# Patient Record
Sex: MALE | Race: BLACK OR AFRICAN AMERICAN | URBAN - METROPOLITAN AREA
[De-identification: names, ages, dates, MRNs, and addresses within clinical notes are randomized per-mention and may not be internally consistent; named-entity substitution may affect disease eponyms.]

---

## 2020-09-15 ENCOUNTER — OFFICE VISIT (OUTPATIENT)
Dept: URBAN - METROPOLITAN AREA CLINIC 17 | Facility: CLINIC | Age: 50
End: 2020-09-15
Payer: COMMERCIAL

## 2020-09-15 ENCOUNTER — LAB OUTSIDE AN ENCOUNTER (OUTPATIENT)
Dept: URBAN - METROPOLITAN AREA CLINIC 17 | Facility: CLINIC | Age: 50
End: 2020-09-15

## 2020-09-15 ENCOUNTER — DASHBOARD ENCOUNTERS (OUTPATIENT)
Age: 50
End: 2020-09-15

## 2020-09-15 DIAGNOSIS — R74.8 ELEVATED LIPASE: ICD-10-CM

## 2020-09-15 DIAGNOSIS — R10.12 LEFT UPPER QUADRANT PAIN: ICD-10-CM

## 2020-09-15 DIAGNOSIS — E66.9 OBESITY (BMI 30.0-34.9): ICD-10-CM

## 2020-09-15 DIAGNOSIS — I10 ESSENTIAL HYPERTENSION: ICD-10-CM

## 2020-09-15 DIAGNOSIS — F10.10 ALCOHOL ABUSE: ICD-10-CM

## 2020-09-15 DIAGNOSIS — R10.11 RIGHT UPPER QUADRANT PAIN: ICD-10-CM

## 2020-09-15 DIAGNOSIS — Z12.11 SCREEN FOR COLON CANCER: ICD-10-CM

## 2020-09-15 DIAGNOSIS — Z80.0 FAMILY HISTORY OF COLON CANCER: ICD-10-CM

## 2020-09-15 DIAGNOSIS — K21.9 GASTROESOPHAGEAL REFLUX DISEASE, ESOPHAGITIS PRESENCE NOT SPECIFIED: ICD-10-CM

## 2020-09-15 PROBLEM — 312824007: Status: ACTIVE | Noted: 2020-09-15

## 2020-09-15 PROBLEM — 43364001: Status: ACTIVE | Noted: 2020-09-15

## 2020-09-15 PROBLEM — 59621000: Status: ACTIVE | Noted: 2020-09-15

## 2020-09-15 PROBLEM — 305058001: Status: ACTIVE | Noted: 2020-09-15

## 2020-09-15 PROBLEM — 235595009: Status: ACTIVE | Noted: 2020-09-15

## 2020-09-15 PROBLEM — 15167005: Status: ACTIVE | Noted: 2020-09-15

## 2020-09-15 PROBLEM — 443371000124107: Status: ACTIVE | Noted: 2020-09-15

## 2020-09-15 PROBLEM — 441957004: Status: ACTIVE | Noted: 2020-09-15

## 2020-09-15 PROBLEM — 301715003: Status: ACTIVE | Noted: 2020-09-15

## 2020-09-15 PROCEDURE — 1036F TOBACCO NON-USER: CPT | Performed by: INTERNAL MEDICINE

## 2020-09-15 PROCEDURE — G8417 CALC BMI ABV UP PARAM F/U: HCPCS | Performed by: INTERNAL MEDICINE

## 2020-09-15 PROCEDURE — G8427 DOCREV CUR MEDS BY ELIG CLIN: HCPCS | Performed by: INTERNAL MEDICINE

## 2020-09-15 PROCEDURE — G8753 SYS BP > OR = 140: HCPCS | Performed by: INTERNAL MEDICINE

## 2020-09-15 PROCEDURE — 99205 OFFICE O/P NEW HI 60 MIN: CPT | Performed by: INTERNAL MEDICINE

## 2020-09-15 PROCEDURE — 3017F COLORECTAL CA SCREEN DOC REV: CPT | Performed by: INTERNAL MEDICINE

## 2020-09-15 RX ORDER — OMEPRAZOLE 10 MG/1
1 CAPSULE 30 MINUTES BEFORE MORNING MEAL CAPSULE, DELAYED RELEASE ORAL ONCE A DAY
Status: DISCONTINUED | COMMUNITY

## 2020-09-15 RX ORDER — OMEPRAZOLE 40 MG/1
1 CAPSULE 30 MINUTES BEFORE MORNING MEAL CAPSULE, DELAYED RELEASE ORAL ONCE A DAY
Status: ACTIVE | COMMUNITY

## 2020-09-15 RX ORDER — HYDROCHLOROTHIAZIDE 12.5 MG/1
1 CAPSULE IN THE MORNING CAPSULE ORAL ONCE A DAY
Status: ACTIVE | COMMUNITY

## 2020-09-15 RX ORDER — CYCLOBENZAPRINE HYDROCHLORIDE 10 MG/1
1 TABLET AT BEDTIME AS NEEDED TABLET, FILM COATED ORAL ONCE A DAY
Status: ACTIVE | COMMUNITY

## 2020-09-15 RX ORDER — GABAPENTIN 300 MG/1
1 CAPSULE CAPSULE ORAL ONCE A DAY
Status: ACTIVE | COMMUNITY

## 2020-09-15 RX ORDER — AMLODIPINE BESYLATE 10 MG/1
1 TABLET TABLET ORAL ONCE A DAY
Status: ACTIVE | COMMUNITY

## 2020-09-15 NOTE — HPI-TODAY'S VISIT:
51 yo male pt of [facility].  He c/o upper abdominal spasms and reflux for 4-5 years.  Spasms are sporadic "sometimes I can just be sitting down". then he raises hands above his head , lasts 5-10 mins and then it resolves.  "I kept taking acid reflux medications". He then saw PCP, "they took blood" and told him "I need to see a specialist, something about enzymes, something was up with my pancreas". This was about 3-4 months ago. His initial visit was a year ago. He has nausea,no vomiting.  He drinks etoh "like every other weekend I might drink 6 or 7 beers". He has reflux daily, takes Omeprazole 40 mg daily, which helps "been on that 6 months but it comes right back" He has no difficulty swallowing.  He has not had his colonoscopy. THere is a fhx of colon CA in 3 uncles, and his mother (same side of the family". Mother was 54, uncles 60 and 72. Another uncle on father's side had it at 65. No records from PCP. He is not loosing weight.  He has regular BMs, no blood in stool

## 2020-09-16 LAB
A/G RATIO: 1.6
ALBUMIN: 4.5
ALKALINE PHOSPHATASE: 82
ALT (SGPT): 48
AMYLASE: 198
AST (SGOT): 36
BASO (ABSOLUTE): 0
BASOS: 1
BILIRUBIN, TOTAL: 0.6
BUN/CREATININE RATIO: 11
BUN: 13
CALCIUM: 9.6
CARBON DIOXIDE, TOTAL: 24
CHLORIDE: 102
CREATININE: 1.16
EGFR IF AFRICN AM: 84
EGFR IF NONAFRICN AM: 73
EOS (ABSOLUTE): 0.1
EOS: 2
GLOBULIN, TOTAL: 2.8
GLUCOSE: 86
HEMATOCRIT: 48.2
HEMATOLOGY COMMENTS:: (no result)
HEMOGLOBIN: 16.7
IMMATURE CELLS: (no result)
IMMATURE GRANS (ABS): 0
IMMATURE GRANULOCYTES: 0
LIPASE: 53
LYMPHS (ABSOLUTE): 2.4
LYMPHS: 42
MCH: 31.7
MCHC: 34.6
MCV: 92
MONOCYTES(ABSOLUTE): 0.4
MONOCYTES: 7
NEUTROPHILS (ABSOLUTE): 2.8
NEUTROPHILS: 48
NRBC: (no result)
PLATELETS: 193
POTASSIUM: 4.2
PROTEIN, TOTAL: 7.3
RBC: 5.26
RDW: 13
SODIUM: 141
WBC: 5.7

## 2020-10-16 ENCOUNTER — OFFICE VISIT (OUTPATIENT)
Dept: URBAN - METROPOLITAN AREA SURGERY CENTER 16 | Facility: SURGERY CENTER | Age: 50
End: 2020-10-16
Payer: COMMERCIAL

## 2020-10-16 DIAGNOSIS — Z80.0 FAMILY HISTORY MALIGNANT NEOPLASM OF BILIARY TRACT: ICD-10-CM

## 2020-10-16 DIAGNOSIS — K29.60 ADENOPAPILLOMATOSIS GASTRICA: ICD-10-CM

## 2020-10-16 DIAGNOSIS — Z12.11 COLON CANCER SCREENING: ICD-10-CM

## 2020-10-16 PROCEDURE — 43239 EGD BIOPSY SINGLE/MULTIPLE: CPT | Performed by: INTERNAL MEDICINE

## 2020-10-16 PROCEDURE — G0105 COLORECTAL SCRN; HI RISK IND: HCPCS | Performed by: INTERNAL MEDICINE

## 2020-10-16 PROCEDURE — G8907 PT DOC NO EVENTS ON DISCHARG: HCPCS | Performed by: INTERNAL MEDICINE

## 2020-10-16 PROCEDURE — G9935 CANC NOT DETECTD DURING SRCN: HCPCS | Performed by: INTERNAL MEDICINE

## 2020-10-16 RX ORDER — AMLODIPINE BESYLATE 10 MG/1
1 TABLET TABLET ORAL ONCE A DAY
Status: ACTIVE | COMMUNITY

## 2020-10-16 RX ORDER — GABAPENTIN 300 MG/1
1 CAPSULE CAPSULE ORAL ONCE A DAY
Status: ACTIVE | COMMUNITY

## 2020-10-16 RX ORDER — CYCLOBENZAPRINE HYDROCHLORIDE 10 MG/1
1 TABLET AT BEDTIME AS NEEDED TABLET, FILM COATED ORAL ONCE A DAY
Status: ACTIVE | COMMUNITY

## 2020-10-16 RX ORDER — HYDROCHLOROTHIAZIDE 12.5 MG/1
1 CAPSULE IN THE MORNING CAPSULE ORAL ONCE A DAY
Status: ACTIVE | COMMUNITY

## 2020-10-16 RX ORDER — OMEPRAZOLE 40 MG/1
1 CAPSULE 30 MINUTES BEFORE MORNING MEAL CAPSULE, DELAYED RELEASE ORAL ONCE A DAY
Status: ACTIVE | COMMUNITY

## 2020-11-25 ENCOUNTER — OFFICE VISIT (OUTPATIENT)
Dept: URBAN - METROPOLITAN AREA MEDICAL CENTER 28 | Facility: MEDICAL CENTER | Age: 50
End: 2020-11-25
Payer: COMMERCIAL

## 2020-11-25 DIAGNOSIS — R93.3 ABN FINDINGS-GI TRACT: ICD-10-CM

## 2020-11-25 PROCEDURE — 992 APS NON BILLABLE: Performed by: INTERNAL MEDICINE

## 2020-12-01 ENCOUNTER — TELEPHONE ENCOUNTER (OUTPATIENT)
Dept: URBAN - METROPOLITAN AREA CLINIC 92 | Facility: CLINIC | Age: 50
End: 2020-12-01